# Patient Record
Sex: MALE
[De-identification: names, ages, dates, MRNs, and addresses within clinical notes are randomized per-mention and may not be internally consistent; named-entity substitution may affect disease eponyms.]

---

## 2018-09-08 ENCOUNTER — HOSPITAL ENCOUNTER (EMERGENCY)
Dept: HOSPITAL 88 - FSED | Age: 63
Discharge: HOME | End: 2018-09-08
Payer: COMMERCIAL

## 2018-09-08 VITALS — WEIGHT: 158 LBS | BODY MASS INDEX: 25.39 KG/M2 | HEIGHT: 66 IN

## 2018-09-08 DIAGNOSIS — J45.31: Primary | ICD-10-CM

## 2018-09-08 PROCEDURE — 99283 EMERGENCY DEPT VISIT LOW MDM: CPT

## 2018-09-08 PROCEDURE — 71045 X-RAY EXAM CHEST 1 VIEW: CPT

## 2018-09-08 NOTE — DIAGNOSTIC IMAGING REPORT
CXR 1 [facility], 9/8/2018 12:00 AM



Technique: CXR 1 [facility]

Comparison: 2/18/2010

Clinical history: Shortness of breath



Findings:

Heart/mediastinum: Stable cardiac silhouette. 2.1 cm nodule projects over the

right hilum.

Lungs/pleural spaces: Linear lingular and right basilar atelectasis or

scarring. No effusion or pneumothorax.



Impression:

1. Right perihilar 2.1 cm nodule. Recommend upright PA and lateral or

nonemergent CT for further assessment.

3. No acute abnormality.



Signed by: Dr Tianna Brown MD on 9/8/2018 8:48 PM

## 2018-10-20 ENCOUNTER — HOSPITAL ENCOUNTER (EMERGENCY)
Dept: HOSPITAL 88 - FSED | Age: 63
Discharge: HOME | End: 2018-10-20
Payer: COMMERCIAL

## 2018-10-20 VITALS — WEIGHT: 158 LBS | HEIGHT: 66 IN | BODY MASS INDEX: 25.39 KG/M2

## 2018-10-20 DIAGNOSIS — J40: ICD-10-CM

## 2018-10-20 DIAGNOSIS — J45.31: ICD-10-CM

## 2018-10-20 DIAGNOSIS — R06.00: Primary | ICD-10-CM

## 2018-10-20 PROCEDURE — 71046 X-RAY EXAM CHEST 2 VIEWS: CPT

## 2018-10-20 PROCEDURE — 99283 EMERGENCY DEPT VISIT LOW MDM: CPT

## 2018-10-20 NOTE — DIAGNOSTIC IMAGING REPORT
EXAMINATION:  CXR 2 VIEW - [facility]    



INDICATION: Shortness of breath. Chest congestion.      



COMPARISON:  Chest x-ray 9/8/2018 11/5/2012

     

FINDINGS:

TUBES and LINES:  None.



LUNGS:  Lungs are well inflated.  Bibasilar platelike atelectasis. Prominent

bilateral jose martin which suggests adenopathy.



On the lateral x-ray there is a 3.6 cm masslike density in the anterior chest,

possibly in the right lung.



PLEURA:  Trace right pleural effusion.



HEART AND MEDIASTINUM:  The cardiomediastinal silhouette is unremarkable. There

are atherosclerotic calcifications within the aorta.



BONES AND SOFT TISSUES:  No acute osseous lesion.  Soft tissues are

unremarkable.



UPPER ABDOMEN: No free air under the diaphragm.    



IMPRESSION: 

Findings concerning for a 3.6 cm right lung mass with hilar lymph nodes.

Recommend CT chest with contrast.



Signed by: Dr. Rishi Mitchell M.D. on 10/20/2018 7:17 PM

## 2018-11-26 ENCOUNTER — HOSPITAL ENCOUNTER (EMERGENCY)
Dept: HOSPITAL 88 - FSED | Age: 63
Discharge: HOME | End: 2018-11-26
Payer: COMMERCIAL

## 2018-11-26 VITALS — HEIGHT: 66 IN | BODY MASS INDEX: 25.39 KG/M2 | WEIGHT: 158 LBS

## 2018-11-26 DIAGNOSIS — R04.2: Primary | ICD-10-CM

## 2018-11-26 DIAGNOSIS — Z85.118: ICD-10-CM

## 2018-11-26 DIAGNOSIS — J00: ICD-10-CM

## 2018-11-26 DIAGNOSIS — E78.5: ICD-10-CM

## 2018-11-26 PROCEDURE — 71046 X-RAY EXAM CHEST 2 VIEWS: CPT

## 2018-11-26 PROCEDURE — 99283 EMERGENCY DEPT VISIT LOW MDM: CPT

## 2018-11-26 NOTE — DIAGNOSTIC IMAGING REPORT
EXAMINATION:  CXR 2 VIEW - [facility]    



INDICATION: Post lung biopsy. Wheezing. Coughing up blood.  

^[id number]

^[id number]



COMPARISON:  10/20/2018

     

FINDINGS:  PA and lateral views



TUBES and LINES:  None.



LUNGS:  Lungs are well inflated.  Stable retrosternal 3.5 cm round opacity,

likely in the right lung. Additional nodular opacities projecting over the

right lower lung. Bibasilar linear opacities representing atelectasis.   There

is no evidence of pneumonia or pulmonary edema.



PLEURA:  No pleural effusion or pneumothorax.



HEART AND MEDIASTINUM:  Stable right hilar prominence. The cardiomediastinal

silhouette is otherwise unremarkable.    



BONES AND SOFT TISSUES:  No acute osseous lesion.  Soft tissues are

unremarkable.



UPPER ABDOMEN: No free air under the diaphragm.    



IMPRESSION: 

No acute thoracic abnormality. No pneumothorax. 

Stable findings concerning for a right lung mass with hilar adenopathy.





Signed by: DR. Del Mackenzie MD on 11/26/2018 4:02 AM

## 2018-11-26 NOTE — XMS REPORT
Continuity of Care Document

                             Created on: 2018



BISHNU WHITFIELD

External Reference #: [id number]

: 1955

Sex: Male



Demographics







                          Address                   [address] [place] [address] [place]  [address]

 

                          Home Phone                [phone]

 

                          Preferred Language        Unknown

 

                          Marital Status            Unknown

 

                          Spiritism Affiliation     Unknown

 

                          Race                      Unknown

 

                          Ethnic Group              Unknown





Author







                          Author                    [facility]              Interface

 

                          Address                   Unknown

 

                          Phone                     Unavailable



                                                    



Problems

                    





                    Problem                            Status                            Onset Date     

                          Classification                            Date Reported       

                          Comments                            Source                    



                                                                        



Medications

                    





                    Medication                            Details                            Route      

                          Status                            Patient Instructions         

                          Ordering Provider                            Order Date           

                                        Source                    

 

                                        Amoxicillin/Potassium Clav (Amox Tr-K Clv 875-125 Mg Tab) 1 Each Tablet, 1 Tab Oral

                            Twice A Day                                              

                    Active                                                                       

                          2017                            [facility]

                    

 

                          Levalbuterol Tartrate (Xopenex Hfa) 15 Gm Hfa.aer.ad                              

                                                      Active                         

                                                                                                    [facility]                    

 

                          Simvastatin (Zocor) 20 Mg Tablet                            Bedtime               

                                                Active                                        

                                                                            [facility]                    



                                                                                
                                       



Allergies, Adverse Reactions, Alerts

                    





                    Substance                            Category                            Reaction   

                          Severity                            Reaction type           

                          Status                            Date Reported                     

                          Comments                            Source                    

 

                    No Known Drug Allergies                                                             

                          Mild                            Allergy to Substance          

                          Active                            2010                       

                                                      [facility]             

       



                                                                        



Immunizations

                    





                    Immunization                            Date Given                            Site  

                          Status                            Last Updated             

                          Comments                            Source                    



                                                                        



Results

                    





                    Order Name                            Results                            Value      

                          Reference Range                            Date                

                          Interpretation                            Comments                       

                                        Source                    



                                                                                



Vital Signs

                    





                    Vital Sign                            Value                            Date         

                          Comments                            Source                    



                                                                        



Encounters

                    





                    Location                            Location Details                            Encounter

 Type                            Encounter Number                            Reason For

 Visit                            Attending Provider                            ADM Date

                            DC Date                            Status                

                                        Source                    

 

                                                                            Departed Emergency Room     

                          [id number]                                                  

                          PAOLA FARFAN MD                            2018                                                        [facility]                    



                                                                                
   



Procedures

                    





                    Procedure                            Code                            Date           

                          Perfomer                            Comments                        

                                        Source